# Patient Record
Sex: FEMALE | Race: WHITE | URBAN - METROPOLITAN AREA
[De-identification: names, ages, dates, MRNs, and addresses within clinical notes are randomized per-mention and may not be internally consistent; named-entity substitution may affect disease eponyms.]

---

## 2024-02-08 ENCOUNTER — OFFICE VISIT (OUTPATIENT)
Dept: FAMILY MEDICINE CLINIC | Facility: CLINIC | Age: 29
End: 2024-02-08
Payer: OTHER GOVERNMENT

## 2024-02-08 VITALS
OXYGEN SATURATION: 98 % | DIASTOLIC BLOOD PRESSURE: 80 MMHG | WEIGHT: 208 LBS | HEIGHT: 61 IN | TEMPERATURE: 98 F | BODY MASS INDEX: 39.27 KG/M2 | HEART RATE: 85 BPM | SYSTOLIC BLOOD PRESSURE: 110 MMHG | RESPIRATION RATE: 16 BRPM

## 2024-02-08 DIAGNOSIS — G44.89 CHRONIC MIXED HEADACHE SYNDROME: Primary | ICD-10-CM

## 2024-02-08 DIAGNOSIS — R51.9 ACUTE INTRACTABLE HEADACHE, UNSPECIFIED HEADACHE TYPE: ICD-10-CM

## 2024-02-08 PROCEDURE — 99214 OFFICE O/P EST MOD 30 MIN: CPT | Performed by: FAMILY MEDICINE

## 2024-02-08 PROCEDURE — 96372 THER/PROPH/DIAG INJ SC/IM: CPT | Performed by: FAMILY MEDICINE

## 2024-02-08 RX ORDER — KETOROLAC TROMETHAMINE 30 MG/ML
60 INJECTION, SOLUTION INTRAMUSCULAR; INTRAVENOUS ONCE
Status: COMPLETED | OUTPATIENT
Start: 2024-02-08 | End: 2024-02-08

## 2024-02-08 RX ADMIN — KETOROLAC TROMETHAMINE 60 MG: 30 INJECTION, SOLUTION INTRAMUSCULAR; INTRAVENOUS at 15:15

## 2024-02-08 ASSESSMENT — PATIENT HEALTH QUESTIONNAIRE - PHQ9
SUM OF ALL RESPONSES TO PHQ QUESTIONS 1-9: 0
2. FEELING DOWN, DEPRESSED OR HOPELESS: 0
SUM OF ALL RESPONSES TO PHQ QUESTIONS 1-9: 0
SUM OF ALL RESPONSES TO PHQ9 QUESTIONS 1 & 2: 0
1. LITTLE INTEREST OR PLEASURE IN DOING THINGS: 0

## 2024-02-08 ASSESSMENT — ENCOUNTER SYMPTOMS
NAUSEA: 1
VOMITING: 1

## 2024-02-08 NOTE — PROGRESS NOTES
Joselin Condon is a 28 y.o. female (: 1995) presenting to address:    Chief Complaint   Patient presents with    Headache     Started midday Tuesday . Seen in er given some medication helped until it wore off .     Immunizations     Would like flu shot .        Vitals:    24 1436   BP: 110/80   Pulse: 85   Resp: 16   Temp: 98 °F (36.7 °C)   SpO2: 98%       \"Have you been to the ER, urgent care clinic since your last visit?  Hospitalized since your last visit?\"    NO    “Have you seen or consulted any other health care providers outside of [facility] since your last visit?”    NO    Flu shot Immunization/s administered 2024 by Ariadna Simon LPN   Patient tolerated procedure well.  No reactions noted.    After obtaining consent, and per orders of Dr. Winters , injection of Ketorolac 60 mg given in  by rAiadna Simon LPN. Patient instructed to remain in clinic for 20 minutes afterwards, and to report any adverse reaction to me immediately.

## 2024-02-08 NOTE — PROGRESS NOTES
Joselin Condon is a 28 y.o. female (: 1995) presenting to address:    Chief Complaint   Patient presents with    Headache     Started midday Tuesday . Seen in er given some medication helped until it wore off .     Immunizations     Would like flu shot .        Vitals:    24 1436   BP: 110/80   Pulse: 85   Resp: 16   Temp: 98 °F (36.7 °C)   SpO2: 98%       \"Have you been to the ER, urgent care clinic since your last visit?  Hospitalized since your last visit?\"    NO    “Have you seen or consulted any other health care providers outside of [facility] since your last visit?”    NO

## 2024-02-08 NOTE — PROGRESS NOTES
HISTORY OF PRESENT ILLNESS  Joselin Hatfield  is a 28 y.o. y.o. female    She returns for follow-up of headaches    She was last seen here on 3/28/2023 with intractable headache.  She had never experienced similar headaches until this event.  A brain MRI was ordered and analgesic medication was provided.  She was asked to return for follow-up in 2 weeks but has never returned.  I find no evidence that the MRI was ever completed.  Today she reports that her  was deployed and she accompanied him to a different duty station.  She was seen in the [facility] emergency department yesterday.  She was treated with intravenous ketorolac, prochlorperazine and diphenhydramine and discharged with a recommendation that she see her PCP and a neurologist.  She continues to report frontal and occipital headaches which are bilateral often but not always with photophobia, sometimes with nausea and vomiting but without aura.  Today she indicates that she has residual posterior neck pain in addition to a persistent headache which returned when the medication administered in the emergency department \"wore off\".  She indicates that she has been experiencing severe headaches about every 2 months.        Mr#: [id number]      No past medical history on file.    Past Surgical History:   Procedure Laterality Date    WISDOM TOOTH EXTRACTION         Family History   Problem Relation Age of Onset    Bipolar Disorder Mother     Schizophrenia Mother     Substance Abuse Mother     Cancer Father     Arthritis Father     Arthritis Paternal Grandfather     Diabetes Paternal Grandfather     Heart Attack Paternal Grandfather     High Blood Pressure Paternal Grandfather     Breast Cancer Paternal Grandmother     Vision Loss Paternal Grandmother        No Known Allergies    Social History     Tobacco Use   Smoking Status Former    Current packs/day: 0.00    Types: Cigarettes    Start date: 1/1/2013    Quit date: 1/1/2018    Years

## 2024-02-08 NOTE — PATIENT INSTRUCTIONS
Current Status:  Episodic intractable headache with unclear etiology    Health Maintenance Recommendations:  Keep current with COVID-19 immunization guidelines  Hepatitis B immunization series  HPV immunization series  Influenza immunization given today  Pap smear    Plan:  Brain MRI  Neurology referral  Ketorolac 60 mg IM  Schedule routine follow-up here or with [facility] depending availability  Please always arrive at least 15 minutes before your scheduled appointment time.

## 2024-02-09 PROCEDURE — 90471 IMMUNIZATION ADMIN: CPT | Performed by: FAMILY MEDICINE

## 2024-02-09 PROCEDURE — 90674 CCIIV4 VAC NO PRSV 0.5 ML IM: CPT | Performed by: FAMILY MEDICINE

## 2024-02-19 ENCOUNTER — TELEPHONE (OUTPATIENT)
Dept: FAMILY MEDICINE CLINIC | Facility: CLINIC | Age: 29
End: 2024-02-19

## 2024-02-19 NOTE — TELEPHONE ENCOUNTER
PATIENT IS  PRIME AND A REFERRAL FROM THEIR ASSIGNED PRIMARY CARE MANAGER (PCM) IS REQUIRED. HAVE PATIENT OBTAIN A REFERRAL FROM THEIR PCM.IF PATIENT NEEDS TO UPDATE HIS/HER PCM INFORMATION, HAVE THEM CALL [facility] @ [phone] AND SELECT ENROLLMENT OPTION.     LVM advising of the above and requested a call back once PCM is updated.